# Patient Record
Sex: MALE | Race: BLACK OR AFRICAN AMERICAN | Employment: OTHER | URBAN - METROPOLITAN AREA
[De-identification: names, ages, dates, MRNs, and addresses within clinical notes are randomized per-mention and may not be internally consistent; named-entity substitution may affect disease eponyms.]

---

## 2017-09-24 ENCOUNTER — HOSPITAL ENCOUNTER (EMERGENCY)
Age: 47
Discharge: HOME | End: 2017-09-24
Attending: EMERGENCY MEDICINE
Payer: COMMERCIAL

## 2017-09-24 ENCOUNTER — APPOINTMENT (OUTPATIENT)
Dept: GENERAL RADIOLOGY | Age: 47
End: 2017-09-24
Attending: NURSE PRACTITIONER
Payer: COMMERCIAL

## 2017-09-24 VITALS
RESPIRATION RATE: 19 BRPM | HEART RATE: 86 BPM | OXYGEN SATURATION: 97 % | HEIGHT: 75 IN | TEMPERATURE: 97.8 F | DIASTOLIC BLOOD PRESSURE: 82 MMHG | WEIGHT: 304 LBS | BODY MASS INDEX: 37.8 KG/M2 | SYSTOLIC BLOOD PRESSURE: 167 MMHG

## 2017-09-24 DIAGNOSIS — J45.909 REACTIVE AIRWAY DISEASE, UNSPECIFIED ASTHMA SEVERITY, UNCOMPLICATED: Primary | ICD-10-CM

## 2017-09-24 DIAGNOSIS — J06.9 ACUTE UPPER RESPIRATORY INFECTION: ICD-10-CM

## 2017-09-24 LAB — DEPRECATED S PYO AG THROAT QL EIA: NEGATIVE

## 2017-09-24 PROCEDURE — 77030029684 HC NEB SM VOL KT MONA -A

## 2017-09-24 PROCEDURE — 94640 AIRWAY INHALATION TREATMENT: CPT

## 2017-09-24 PROCEDURE — 87070 CULTURE OTHR SPECIMN AEROBIC: CPT | Performed by: EMERGENCY MEDICINE

## 2017-09-24 PROCEDURE — 87880 STREP A ASSAY W/OPTIC: CPT | Performed by: NURSE PRACTITIONER

## 2017-09-24 PROCEDURE — 71010 XR CHEST PORT: CPT

## 2017-09-24 PROCEDURE — 74011000250 HC RX REV CODE- 250: Performed by: EMERGENCY MEDICINE

## 2017-09-24 PROCEDURE — 93005 ELECTROCARDIOGRAM TRACING: CPT

## 2017-09-24 PROCEDURE — 74011636637 HC RX REV CODE- 636/637: Performed by: NURSE PRACTITIONER

## 2017-09-24 PROCEDURE — 99283 EMERGENCY DEPT VISIT LOW MDM: CPT

## 2017-09-24 RX ORDER — PREDNISONE 5 MG/1
TABLET ORAL
Qty: 21 TAB | Refills: 0 | Status: SHIPPED | OUTPATIENT
Start: 2017-09-24

## 2017-09-24 RX ORDER — AZITHROMYCIN 250 MG/1
TABLET, FILM COATED ORAL
Qty: 6 TAB | Refills: 0 | Status: SHIPPED | OUTPATIENT
Start: 2017-09-24 | End: 2017-09-29

## 2017-09-24 RX ORDER — BENZONATATE 100 MG/1
100 CAPSULE ORAL
Qty: 30 CAP | Refills: 0 | Status: SHIPPED | OUTPATIENT
Start: 2017-09-24 | End: 2017-10-01

## 2017-09-24 RX ORDER — IPRATROPIUM BROMIDE AND ALBUTEROL SULFATE 2.5; .5 MG/3ML; MG/3ML
3 SOLUTION RESPIRATORY (INHALATION)
Status: COMPLETED | OUTPATIENT
Start: 2017-09-24 | End: 2017-09-24

## 2017-09-24 RX ORDER — PREDNISONE 20 MG/1
20 TABLET ORAL
COMMUNITY

## 2017-09-24 RX ORDER — PREDNISONE 20 MG/1
60 TABLET ORAL
Status: COMPLETED | OUTPATIENT
Start: 2017-09-24 | End: 2017-09-24

## 2017-09-24 RX ORDER — ALBUTEROL SULFATE 90 UG/1
2 AEROSOL, METERED RESPIRATORY (INHALATION)
Qty: 1 INHALER | Refills: 0 | Status: SHIPPED | OUTPATIENT
Start: 2017-09-24

## 2017-09-24 RX ORDER — FLUTICASONE PROPIONATE 50 MCG
2 SPRAY, SUSPENSION (ML) NASAL DAILY
Qty: 1 BOTTLE | Refills: 0 | Status: SHIPPED | OUTPATIENT
Start: 2017-09-24

## 2017-09-24 RX ORDER — CETIRIZINE HCL 10 MG
10 TABLET ORAL DAILY
Qty: 30 TAB | Refills: 0 | Status: SHIPPED | OUTPATIENT
Start: 2017-09-24 | End: 2017-10-24

## 2017-09-24 RX ADMIN — IPRATROPIUM BROMIDE AND ALBUTEROL SULFATE 3 ML: .5; 3 SOLUTION RESPIRATORY (INHALATION) at 10:34

## 2017-09-24 RX ADMIN — PREDNISONE 60 MG: 20 TABLET ORAL at 10:46

## 2017-09-24 RX ADMIN — IPRATROPIUM BROMIDE AND ALBUTEROL SULFATE 3 ML: .5; 3 SOLUTION RESPIRATORY (INHALATION) at 10:10

## 2017-09-24 NOTE — ED PROVIDER NOTES
HPI Comments: Gena Gordon is a 48 y.o. male presents to [facility] ED with cc of wheezing. Reports onset 2 months ago after her started traveling for his job. Relocated to [facility] symptoms worse. In [place] due to work. wheezing started ran out of inhaler    Patient specifically denies fever, chills, nausea, vomitng, chest pain, shortness of breath, headache, rash, diarrhea ,sweating or weight loss. Patient is a 48 y.o. male presenting with shortness of breath. The history is provided by the patient. No  was used. Shortness of Breath   This is a new problem. The problem occurs intermittently. The current episode started more than 2 days ago. The problem has not changed since onset. Associated symptoms include coryza, cough and wheezing. Pertinent negatives include no fever, no headaches, no sore throat, no swollen glands, no neck pain, no chest pain, no abdominal pain, no rash and no leg swelling. The problem's precipitants include pollens. He has tried beta-agonist inhalers and oral steroids for the symptoms. The treatment provided moderate relief. He has had no prior hospitalizations. He has had prior ED visits. He has had no prior ICU admissions. Associated medical issues comments: unremarkable. History reviewed. No pertinent past medical history. History reviewed. No pertinent surgical history. History reviewed. No pertinent family history. Social History     Social History    Marital status: N/A     Spouse name: N/A    Number of children: N/A    Years of education: N/A     Occupational History    Not on file. Social History Main Topics    Smoking status: Never Smoker    Smokeless tobacco: Never Used    Alcohol use No    Drug use: No    Sexual activity: Not on file     Other Topics Concern    Not on file     Social History Narrative    No narrative on file         ALLERGIES: Review of patient's allergies indicates no known allergies.     Review of Systems Constitutional: Negative for chills, fatigue and fever. HENT: Negative for congestion and sore throat. Eyes: Negative for redness. Respiratory: Positive for cough, shortness of breath and wheezing. Negative for chest tightness. Cardiovascular: Negative for chest pain and leg swelling. Gastrointestinal: Negative for abdominal pain. Genitourinary: Negative for dysuria. Musculoskeletal: Negative for arthralgias, back pain, myalgias, neck pain and neck stiffness. Skin: Negative for rash. Neurological: Negative for dizziness, syncope, weakness, light-headedness, numbness and headaches. Hematological: Negative for adenopathy. Psychiatric/Behavioral: Negative for agitation and behavioral problems. All other systems reviewed and are negative. Vitals:    09/24/17 1003 09/24/17 1010   BP: 167/82    Pulse: 86    Resp: 19    Temp: 97.8 °F (36.6 °C)    SpO2: 96% 100%   Weight: 137.9 kg (304 lb)    Height: 6' 3\" (1.905 m)             Physical Exam   Constitutional: He is oriented to person, place, and time. He appears well-developed and well-nourished. HENT:   Head: Normocephalic and atraumatic. Right Ear: External ear normal.   Mouth/Throat: Oropharynx is clear and moist.   Eyes: Conjunctivae are normal. Right eye exhibits no discharge. Left eye exhibits no discharge. Neck: Normal range of motion. Neck supple. Cardiovascular: Normal rate, regular rhythm and normal heart sounds. Pulmonary/Chest: Effort normal. No respiratory distress. He has wheezes (scattered upper and lower lobe wheeze). Abdominal: Soft. Bowel sounds are normal. There is no tenderness. Musculoskeletal: Normal range of motion. He exhibits no edema. Lymphadenopathy:     He has no cervical adenopathy. Neurological: He is alert and oriented to person, place, and time. No cranial nerve deficit. Skin: Skin is warm and dry. Psychiatric: He has a normal mood and affect.  His behavior is normal. Judgment and thought content normal.   Nursing note and vitals reviewed. MDM  Number of Diagnoses or Management Options  Acute upper respiratory infection:   Reactive airway disease, unspecified asthma severity, uncomplicated:   Diagnosis management comments: DDX Reactive airway disease bronchitis URI       Amount and/or Complexity of Data Reviewed  Tests in the radiology section of CPT®: ordered and reviewed  Discuss the patient with other providers: yes      ED Course       Procedures    Pt has been reevaluated. There are no new complaints, changes, or physical findings at this time. Medications have been reviewed w/ pt and/or family. Pt and/or family's questions have been answered. Pt and/or family expressed good understanding of the dx/tx/rx and is in agreement with plan of care. Pt instructed and agreed to f/u w/PCP and to return to ED upon further deterioration. Pt is ready for discharge. LABORATORY TESTS:  Recent Results (from the past 12 hour(s))   EKG, 12 LEAD, INITIAL    Collection Time: 09/24/17 10:43 AM   Result Value Ref Range    Ventricular Rate 86 BPM    Atrial Rate 86 BPM    P-R Interval 158 ms    QRS Duration 86 ms    Q-T Interval 360 ms    QTC Calculation (Bezet) 430 ms    Calculated P Axis 27 degrees    Calculated R Axis 42 degrees    Calculated T Axis 3 degrees    Diagnosis       Normal sinus rhythm  Nonspecific ST and T wave abnormality  Abnormal ECG  No previous ECGs available     STREP AG SCREEN, GROUP A    Collection Time: 09/24/17 10:47 AM   Result Value Ref Range    Group A Strep Ag ID NEGATIVE  NEG         IMAGING RESULTS:  XR CHEST PORT   Final Result        Xr Chest Port    Result Date: 9/24/2017  INDICATION:   chest pain, shortness of breath EXAM:  AP CHEST RADIOGRAPH COMPARISON: None FINDINGS: AP portable view of the chest demonstrates a normal cardiomediastinal silhouette. The lungs are adequately expanded. There is no edema, effusion, consolidation, or pneumothorax.   The osseous structures are unremarkable. IMPRESSION: No acute process. MEDICATIONS GIVEN:  Medications   albuterol-ipratropium (DUO-NEB) 2.5 MG-0.5 MG/3 ML (3 mL Nebulization Given 9/24/17 1010)   albuterol-ipratropium (DUO-NEB) 2.5 MG-0.5 MG/3 ML (3 mL Nebulization Given 9/24/17 1034)   predniSONE (DELTASONE) tablet 60 mg (60 mg Oral Given 9/24/17 1046)       IMPRESSION:  1. Reactive airway disease, unspecified asthma severity, uncomplicated    2. Acute upper respiratory infection        PLAN:  1. Discharge Medication List as of 9/24/2017 11:50 AM      START taking these medications    Details   predniSONE (STERAPRED) 5 mg dose pack See administration instruction per 5mg dose pack, Print, Disp-21 Tab, R-0      benzonatate (TESSALON PERLES) 100 mg capsule Take 1 Cap by mouth three (3) times daily as needed for Cough for up to 7 days. , Print, Disp-30 Cap, R-0      fluticasone (FLONASE) 50 mcg/actuation nasal spray 2 Sprays by Both Nostrils route daily. , Print, Disp-1 Bottle, R-0      azithromycin (ZITHROMAX Z-AME) 250 mg tablet As directed, Print, Disp-6 Tab, R-0      cetirizine (ZYRTEC) 10 mg tablet Take 1 Tab by mouth daily for 30 days. , Print, Disp-30 Tab, R-0      !! albuterol (PROVENTIL HFA, VENTOLIN HFA, PROAIR HFA) 90 mcg/actuation inhaler Take 2 Puffs by inhalation every four (4) hours as needed for Wheezing., Print, Disp-1 Inhaler, R-0       !! - Potential duplicate medications found. Please discuss with provider. CONTINUE these medications which have NOT CHANGED    Details   !! ALBUTEROL SULFATE IN Take  by inhalation. , Historical Med      predniSONE (DELTASONE) 20 mg tablet Take 20 mg by mouth daily (with breakfast). , Historical Med       !! - Potential duplicate medications found. Please discuss with provider.         2.   Follow-up Information     Follow up With Details Comments [address] [facility] In 3 days  [address] [facility] [address] Return to ED if worse

## 2017-09-24 NOTE — DISCHARGE INSTRUCTIONS

## 2017-09-24 NOTE — ED NOTES
Per pt reports expiratory wheezing and sob that started approx 1 month ago and worsened over the last two weeks. 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 Denies wearing a mask at work. Seen in ER a month ago and prescribed prednisone and albuterol inhaler with symptom relief.

## 2017-09-25 LAB
ATRIAL RATE: 86 BPM
CALCULATED P AXIS, ECG09: 27 DEGREES
CALCULATED R AXIS, ECG10: 42 DEGREES
CALCULATED T AXIS, ECG11: 3 DEGREES
DIAGNOSIS, 93000: NORMAL
P-R INTERVAL, ECG05: 158 MS
Q-T INTERVAL, ECG07: 360 MS
QRS DURATION, ECG06: 86 MS
QTC CALCULATION (BEZET), ECG08: 430 MS
VENTRICULAR RATE, ECG03: 86 BPM

## 2017-09-26 LAB
BACTERIA SPEC CULT: NORMAL
SERVICE CMNT-IMP: NORMAL

## 2019-01-07 ENCOUNTER — HOSPITAL ENCOUNTER (EMERGENCY)
Dept: HOSPITAL 5 - ED | Age: 49
Discharge: HOME | End: 2019-01-07
Payer: SELF-PAY

## 2019-01-07 VITALS — DIASTOLIC BLOOD PRESSURE: 59 MMHG | SYSTOLIC BLOOD PRESSURE: 120 MMHG

## 2019-01-07 DIAGNOSIS — M79.604: ICD-10-CM

## 2019-01-07 DIAGNOSIS — I10: ICD-10-CM

## 2019-01-07 DIAGNOSIS — M47.816: Primary | ICD-10-CM

## 2019-01-07 DIAGNOSIS — M54.41: ICD-10-CM

## 2019-01-07 PROCEDURE — 96374 THER/PROPH/DIAG INJ IV PUSH: CPT

## 2019-01-07 PROCEDURE — 99284 EMERGENCY DEPT VISIT MOD MDM: CPT

## 2019-01-07 PROCEDURE — 72100 X-RAY EXAM L-S SPINE 2/3 VWS: CPT

## 2019-01-07 PROCEDURE — 96375 TX/PRO/DX INJ NEW DRUG ADDON: CPT

## 2019-01-07 NOTE — EMERGENCY DEPARTMENT REPORT
HPI





- General


Chief Complaint: Back Pain/Injury


Time Seen by Provider: 01/07/19 16:01





- HPI


HPI: 


48-year-old  male presents to the emergency department with 

complaint of pain starting in the right lateral lower back and radiating down 

his right leg, through the buttock, down to the calf.  This has been going on 

for the past few days.  The patient had this occur one time in the past when he 

fell off a roof while doing work.  He was evaluated at that time was found to 

have some herniated disks in the lumbar spine.  Patient denies any recent trauma

but does say that he was working out at the gym recently just before his 

symptoms started.  He denies any problems with bowel or bladder, numbness or 

paresthesias or any neurological deficits.  It is a burning pain/sensation from 

the back to the leg.  He otherwise just has a past nuchal history of 

hypertension.  He tried some diclofenac for his discomfort without any relief.








ED Past Medical Hx





- Past Medical History


Hx Hypertension: Yes





- Surgical History


Past Surgical History?: No





- Social History


Smoking Status: Never Smoker


Substance Use Type: None





- Medications


Home Medications: 


                                Home Medications











 Medication  Instructions  Recorded  Confirmed  Last Taken  Type


 


RX: predniSONE [Deltasone] 20 mg PO BID #10 tab 01/07/19  Unknown Rx


 


oxyCODONE /ACETAMINOPHEN [Percocet 1 tab PO Q6HR PRN #12 tablet 01/07/19  

Unknown Rx





5/325]     














ED Review of Systems


ROS: 


Stated complaint: LOWER BACK PAIN


Other details as noted in HPI





Comment: All other systems reviewed and negative


Constitutional: denies: chills, fever


Eyes: denies: eye pain, eye discharge, vision change


ENT: denies: ear pain, throat pain


Respiratory: denies: cough, shortness of breath, wheezing


Cardiovascular: denies: chest pain, palpitations


Gastrointestinal: denies: abdominal pain, vomiting


Genitourinary: denies: dysuria, discharge


Musculoskeletal: back pain, myalgia


Skin: denies: rash, lesions


Neurological: denies: weakness, numbness





Physical Exam





- Physical Exam


Vital Signs: 


                                   Vital Signs











  01/07/19





  14:46


 


Temperature 98.3 F


 


Pulse Rate 81


 


Respiratory 18





Rate 


 


Blood Pressure 126/84


 


O2 Sat by Pulse 98





Oximetry 











Physical Exam: 





GENERAL: The patient is well-developed well-nourished.


HEENT: Normocephalic.  Atraumatic.   Patient has moist mucous membranes.


EYES:  Extraocular motions are intact.  Pupils are equal and reactive to light 

bilaterally.


NECK: Supple.  Trachea is midline.


CHEST/LUNGS: Clear to auscultation.  There is no respiratory distress noted.  


HEART/CARDIOVASCULAR: Regular.  There is no tachycardia.  There is no obvious 

murmur.


ABDOMEN: Abdomen is soft, nontender.  Patient has normal bowel sounds.  Obese 

habitus.


SKIN: Skin is warm and dry.


NEURO: The patient is awake, alert, and oriented.  The patient is cooperative.  

The patient has no focal neurologic deficits.  The patient has normal speech.


MUSCULOSKELETAL: Right leg pain is not reproducible to palpation but it does 

worsen with both passive and active movement.  Positive right straight leg raise

test.


BACK: There is both midline and right-sided paraspinal lumbar tenderness to 

palpation but no step-off or deformity.





ED Course


                                   Vital Signs











  01/07/19





  14:46


 


Temperature 98.3 F


 


Pulse Rate 81


 


Respiratory 18





Rate 


 


Blood Pressure 126/84


 


O2 Sat by Pulse 98





Oximetry 














ED Medical Decision Making





- Radiology Data


Radiology results: report reviewed





EXAM: XR SPINE LUMBOSACRAL 2-3V 





HISTORY: low back pain 





TECHNIQUE: Lumbar spine three views 





PRIORS: None. 





FINDINGS: 


There is degenerative disc space narrowing at L4-5 and L5-S1 with marginal 

anterior vertebral body 


osteophytes. The vertebral bodies are normal in height and alignment. Remaining 

disc spaces are 


within normal limits. Posterior elements are intact. SI joints are unremarkable 





IMPRESSION: 


Degenerative disc disease L4-5 and L5-S1 with marked disc space narrowing 











Transcribed By: MARLY 


Dictated By: RAFIQ HANNON MD 


Electronically Authenticated By: RAFIQ HANNON MD 


Signed Date/Time: 01/07/19 2640 





- Medical Decision Making


Patient presents with a few days of right-sided low back pain with radiation 

down the right leg that appears consistent with a sciatica and peripheral 

neuropathy/radiculopathy.  There is been no recent trauma but the patient did 

have trauma in the past that originally caused the same kind of pain.  An x-ray 

was done that shows some degenerative joint disease but otherwise no other acute

process.  Patient was given some steroids and a few doses of pain medication 

with some improvement in his symptoms.  The symptoms were worsened with bearing 

weight so the patient was given crutches and was able to ambulate using the 

crutches around the emergency department and appeared stable.  He has no 

problems with bowel or bladder, numbness or paresthesias or any neurological 

deficits.  He appears lower suspicion for any of the emergent back conditions 

such as cauda equina or epidural abscess or cord compression syndrome.  The 

patient has been given a prescription for some steroids, pain medication and has

been given a referral for both orthopedist and neurosurgery.  He will follow up 

with these doctors, as well as his primary care physician, but he will return to

the ER with any worsening of his symptoms or any acute distress.








- Differential Diagnosis


sciatica, peripheral neuropathy, herniated disc


Critical Care Time: No


Critical care attestation.: 


If time is entered above; I have spent that time in minutes in the direct care 

of this critically ill patient, excluding procedure time.








ED Disposition


Clinical Impression: 


 Right leg pain





Low back pain


Qualifiers:


 Chronicity: acute Back pain laterality: right Sciatica presence: with sciatica 

Sciatica laterality: sciatica of right side Qualified Code(s): M54.41 - Lumbago 

with sciatica, right side





Sciatica


Qualifiers:


 Laterality: right Qualified Code(s): M54.31 - Sciatica, right side





Degenerative joint disease (DJD) of lumbar spine


Qualifiers:


 Spinal osteoarthritis complication: unspecified spinal osteoarthritis Qualified

Code(s): M47.816 - Spondylosis without myelopathy or radiculopathy, lumbar 

region





Disposition: DC-01 TO HOME OR SELFCARE


Is pt being admited?: No


Condition: Stable


Instructions:  Sciatica (ED), Lumbar Radiculopathy (ED)


Additional Instructions: 


Please follow up with either an orthopedist or neurosurgeon regarding your low 

back pain, leg pain and sciatica.





Return to the emergency Department with any worsening of your symptoms or any 

acute distress.





You have been prescribed a medication that can be sedating.  Therefore, this 

medication cannot be taken prior to driving, working, being responsible for 

children, and cannot be mixed with alcohol of any quantity.


Prescriptions: 


oxyCODONE /ACETAMINOPHEN [Percocet 5/325] 1 tab PO Q6HR PRN #12 tablet


 PRN Reason: Pain


RX: predniSONE [Deltasone] 20 mg PO BID #10 tab


Referrals: 


[facility] [Provider Group] - 2-3 Days


PRIMARY CARE,MD [Primary Care Provider] - 2-3 Days


RONALDO PLUNKETT MD [Staff Physician] - 2-3 Days

## 2019-01-07 NOTE — XRAY REPORT
FINAL REPORT



EXAM:  XR SPINE LUMBOSACRAL 2-3V



HISTORY:  low back pain 



TECHNIQUE:  Lumbar spine three views 



PRIORS:  None.



FINDINGS:  

There is degenerative disc space narrowing at L4-5 and L5-S1 with marginal anterior vertebral body os
teophytes. The vertebral bodies are normal in height and alignment. Remaining disc spaces are within 
normal limits. Posterior elements are intact. SI joints are unremarkable 



IMPRESSION:  

Degenerative disc disease L4-5 and L5-S1 with marked disc space narrowing

## 2021-06-14 ENCOUNTER — OFFICE VISIT (OUTPATIENT)
Dept: URBAN - METROPOLITAN AREA SURGERY CENTER 16 | Facility: SURGERY CENTER | Age: 51
End: 2021-06-14
Payer: COMMERCIAL

## 2021-06-14 ENCOUNTER — CLAIMS CREATED FROM THE CLAIM WINDOW (OUTPATIENT)
Dept: URBAN - METROPOLITAN AREA CLINIC 4 | Facility: CLINIC | Age: 51
End: 2021-06-14
Payer: COMMERCIAL

## 2021-06-14 DIAGNOSIS — D12.3 ADENOMA OF TRANSVERSE COLON: ICD-10-CM

## 2021-06-14 DIAGNOSIS — D12.2 BENIGN NEOPLASM OF ASCENDING COLON: ICD-10-CM

## 2021-06-14 DIAGNOSIS — D12.2 ADENOMA OF ASCENDING COLON: ICD-10-CM

## 2021-06-14 DIAGNOSIS — D12.3 BENIGN NEOPLASM OF TRANSVERSE COLON: ICD-10-CM

## 2021-06-14 DIAGNOSIS — Z12.11 COLON CANCER SCREENING: ICD-10-CM

## 2021-06-14 PROCEDURE — G8907 PT DOC NO EVENTS ON DISCHARG: HCPCS | Performed by: INTERNAL MEDICINE

## 2021-06-14 PROCEDURE — 88305 TISSUE EXAM BY PATHOLOGIST: CPT | Performed by: PATHOLOGY

## 2021-06-14 PROCEDURE — 45380 COLONOSCOPY AND BIOPSY: CPT | Performed by: INTERNAL MEDICINE

## 2021-06-14 PROCEDURE — 45385 COLONOSCOPY W/LESION REMOVAL: CPT | Performed by: INTERNAL MEDICINE
